# Patient Record
Sex: MALE | URBAN - METROPOLITAN AREA
[De-identification: names, ages, dates, MRNs, and addresses within clinical notes are randomized per-mention and may not be internally consistent; named-entity substitution may affect disease eponyms.]

---

## 2021-07-27 ENCOUNTER — TRANSCRIBE ORDER (OUTPATIENT)
Dept: SCHEDULING | Age: 66
End: 2021-07-27

## 2023-05-10 ENCOUNTER — HOSPITAL ENCOUNTER (OUTPATIENT)
Facility: HOSPITAL | Age: 68
Setting detail: OUTPATIENT SURGERY
Discharge: HOME | End: 2023-05-10
Attending: INTERNAL MEDICINE | Admitting: INTERNAL MEDICINE
Payer: MEDICARE

## 2023-05-10 ENCOUNTER — ANESTHESIA EVENT (OUTPATIENT)
Facility: HOSPITAL | Age: 68
End: 2023-05-10
Payer: MEDICARE

## 2023-05-10 ENCOUNTER — ANESTHESIA (OUTPATIENT)
Facility: HOSPITAL | Age: 68
End: 2023-05-10
Payer: MEDICARE

## 2023-05-10 VITALS
RESPIRATION RATE: 15 BRPM | SYSTOLIC BLOOD PRESSURE: 122 MMHG | WEIGHT: 203.48 LBS | TEMPERATURE: 97.9 F | OXYGEN SATURATION: 100 % | BODY MASS INDEX: 26.97 KG/M2 | HEIGHT: 73 IN | DIASTOLIC BLOOD PRESSURE: 71 MMHG | HEART RATE: 61 BPM

## 2023-05-10 LAB
GLUCOSE BLD STRIP.AUTO-MCNC: 86 MG/DL (ref 65–117)
SERVICE CMNT-IMP: NORMAL

## 2023-05-10 PROCEDURE — 3600007502: Performed by: INTERNAL MEDICINE

## 2023-05-10 PROCEDURE — 82962 GLUCOSE BLOOD TEST: CPT

## 2023-05-10 PROCEDURE — 3700000001 HC ADD 15 MINUTES (ANESTHESIA): Performed by: INTERNAL MEDICINE

## 2023-05-10 PROCEDURE — 7100000010 HC PHASE II RECOVERY - FIRST 15 MIN: Performed by: INTERNAL MEDICINE

## 2023-05-10 PROCEDURE — 3600007512: Performed by: INTERNAL MEDICINE

## 2023-05-10 PROCEDURE — 2709999900 HC NON-CHARGEABLE SUPPLY: Performed by: INTERNAL MEDICINE

## 2023-05-10 PROCEDURE — 2500000003 HC RX 250 WO HCPCS: Performed by: NURSE ANESTHETIST, CERTIFIED REGISTERED

## 2023-05-10 PROCEDURE — 7100000011 HC PHASE II RECOVERY - ADDTL 15 MIN: Performed by: INTERNAL MEDICINE

## 2023-05-10 PROCEDURE — 2580000003 HC RX 258: Performed by: INTERNAL MEDICINE

## 2023-05-10 PROCEDURE — 3700000000 HC ANESTHESIA ATTENDED CARE: Performed by: INTERNAL MEDICINE

## 2023-05-10 RX ORDER — SODIUM CHLORIDE 0.9 % (FLUSH) 0.9 %
5-40 SYRINGE (ML) INJECTION EVERY 12 HOURS SCHEDULED
Status: DISCONTINUED | OUTPATIENT
Start: 2023-05-10 | End: 2023-05-10 | Stop reason: HOSPADM

## 2023-05-10 RX ORDER — LIDOCAINE HYDROCHLORIDE 20 MG/ML
INJECTION, SOLUTION EPIDURAL; INFILTRATION; INTRACAUDAL; PERINEURAL PRN
Status: DISCONTINUED | OUTPATIENT
Start: 2023-05-10 | End: 2023-05-10 | Stop reason: SDUPTHER

## 2023-05-10 RX ORDER — SODIUM CHLORIDE 0.9 % (FLUSH) 0.9 %
5-40 SYRINGE (ML) INJECTION PRN
Status: DISCONTINUED | OUTPATIENT
Start: 2023-05-10 | End: 2023-05-10 | Stop reason: HOSPADM

## 2023-05-10 RX ORDER — SITAGLIPTIN AND METFORMIN HYDROCHLORIDE 1000; 50 MG/1; MG/1
1 TABLET, FILM COATED ORAL DAILY
COMMUNITY

## 2023-05-10 RX ORDER — SODIUM CHLORIDE 9 MG/ML
25 INJECTION, SOLUTION INTRAVENOUS PRN
Status: DISCONTINUED | OUTPATIENT
Start: 2023-05-10 | End: 2023-05-10 | Stop reason: HOSPADM

## 2023-05-10 RX ORDER — LISINOPRIL 10 MG/1
10 TABLET ORAL DAILY
COMMUNITY

## 2023-05-10 RX ORDER — TADALAFIL 5 MG/1
5 TABLET ORAL PRN
COMMUNITY

## 2023-05-10 RX ADMIN — SODIUM CHLORIDE: 9 INJECTION, SOLUTION INTRAVENOUS at 15:00

## 2023-05-10 RX ADMIN — LIDOCAINE HYDROCHLORIDE 60 MG: 20 INJECTION, SOLUTION EPIDURAL; INFILTRATION; INTRACAUDAL; PERINEURAL at 15:42

## 2023-05-10 ASSESSMENT — PAIN SCALES - GENERAL
PAINLEVEL_OUTOF10: 0

## 2023-05-10 ASSESSMENT — PAIN - FUNCTIONAL ASSESSMENT: PAIN_FUNCTIONAL_ASSESSMENT: NONE - DENIES PAIN

## 2023-05-10 NOTE — DISCHARGE INSTRUCTIONS
Varsha Giordano  [id number]  1955    DISCHARGE INSTRUCTIONS    Impression:  Colonic diverticulosis; no diverticulitis; no polyps    Recommendations:   Because of age, routine follow up exam not recommended     Discomfort:  Redness at IV site- apply warm compress to area; if redness or soreness persist- contact your physician. There may be a slight amount of blood passed from the rectum. Gaseous discomfort - walking, belching will help relieve any discomfort. You may not operate a vehicle for 12 hours. You may not engage in an occupation involving machinery or appliances for rest of today. You may not drink alcoholic beverages for at least 12 hours. Avoid making any critical decisions for at least 24 hours. DIET:   High fiber diet. Medications:                Resume usual medications today   ACTIVITY:  You may resume your normal daily activities it is recommended that you spend the remainder of the day resting -  avoid any strenuous activity. CALL M.D. ANY SIGN OF:   Increasing pain, nausea, vomiting  Abdominal distension (swelling)  New increased bleeding (oral or rectal)  Fever (chills)  Pain in chest area  Bloody discharge from nose or mouth  Shortness of breath     Follow-up Instructions:  Call Dr. Angela Isabel if you have any questions or problems        DISCHARGE SUMMARY from Nurse    The following personal items collected during your admission are returned to you:   Dental Appliance:    Vision:    Hearing Aid:    Jewelry:    Clothing:    Other Valuables:    Valuables sent to safe: Dose (mL/hr) Propofol : *70 mg + 77.532 mL/hr (There are multiple administrations at this time.   Please see the [facility] for detailed information.)

## 2023-05-10 NOTE — H&P
Gastroenterology Outpatient History and Physical    Patient: Bairesmirian Bowser    Physician: Ayo Montano MD    Vital Signs: See nursing notes    Allergies: Lipitor    Chief Complaint: Colon polyp surveillance    History of Present Illness: Personal history of tubular adenomas x4 removed from the colon in 2015; here for follow-up. Personal history of cardiac disease; had cardiac cath, nuclear cardiac testing in April 2022; no cardiac ischemia identified. Has a pacemaker; magnet can be used for pacemaker as necessary. Justification for Procedure: Polyp surveillance    History:  No past medical history on file. No past surgical history on file. No family history on file. Medications:   Prior to Admission medications    Not on File       Physical Exam:   General: alert, cooperative, no distress   HEENT: Head: Normal, normocephalic, atraumatic. Heart: regular rate and rhythm   Lungs: chest clear, no wheezing, rales, normal symmetric air entry   Abdominal: Bowel sounds are normal, liver is not enlarged, spleen is not enlarged           Findings/Diagnosis: Colon polyp surveillance    Plan of Care/Planned Procedure: I've discussed colonoscopy possible biopsy, polypectomy, cautery, injection, alternatives, complications including but not limited to pain, cardiopulmonary event, bleeding, perforation requiring additional blood transfusion or operative repair; all questions answered.

## 2023-05-10 NOTE — ANESTHESIA PRE PROCEDURE
Department of Anesthesiology  Preprocedure Note       Name:  Carlos Kline   Age:  70 y.o.  :  1955                                          MRN:  [id number]         Date:  5/10/2023      Surgeon: Yovany Romero):  Mckay Leonard MD    Procedure: Procedure(s):  COLONOSCOPY    Medications prior to admission:   Prior to Admission medications    Not on File       Current medications:    Current Facility-Administered Medications   Medication Dose Route Frequency Provider Last Rate Last Admin    sodium chloride flush 0.9 % injection 5-40 mL  5-40 mL IntraVENous 2 times per day Mckay Leonard MD        sodium chloride flush 0.9 % injection 5-40 mL  5-40 mL IntraVENous PRN Mckay Leonard MD        0.9 % sodium chloride infusion  25 mL IntraVENous PRN Mckay Leonard MD           Allergies: Allergies   Allergen Reactions    Lipitor [Atorvastatin] Other (See Comments)     Blood in the urine        Problem List:  There is no problem list on file for this patient. Past Medical History:        Diagnosis Date    Diabetes mellitus ([facility] [address]        Past Surgical History:        Procedure Laterality Date    HERNIA REPAIR      KNEE SURGERY Bilateral     Laproscopic knee surgery bilaterally    PACEMAKER PLACEMENT      AV pacemaker       Social History:    Social History     Tobacco Use    Smoking status: Never    Smokeless tobacco: Never   Substance Use Topics    Alcohol use: Not Currently                                Counseling given: Not Answered      Vital Signs (Current): There were no vitals filed for this visit.                                            BP Readings from Last 3 Encounters:   No data found for BP       NPO Status:                                                                                 BMI:   Wt Readings from Last 3 Encounters:   No data found for Wt     There is no height or weight on file to calculate BMI.    CBC: No results found for: WBC, RBC, HGB, HCT, MCV, RDW, PLT    CMP: No results found for:

## 2023-05-10 NOTE — PROGRESS NOTES
Endoscopy discharge instructions have been reviewed and given to patient. The patient verbalized understanding and acceptance of instructions. Dr. Jennifer Syed discussed with patient procedure findings and next steps. Glasses  returned to patient.

## 2023-05-10 NOTE — PROCEDURES
none    Implants: none    Complications:   None; patient tolerated the procedure well. Estimated blood loss: none    Impression:  Colonic diverticulosis; no diverticulitis; no polyps    Recommendations:   Because of age, routine follow up exam not recommended     Thank you for entrusting me with this patient's care. Please do not hesitate to contact me with any questions or if I can be of assistance with any of your other patients' GI needs.     Signed By: Odalis Deleon MD                        May 10, 2023

## 2023-05-10 NOTE — ANESTHESIA PRE PROCEDURE
Department of Anesthesiology  Preprocedure Note       Name:  Shazia Lara   Age:  70 y.o.  :  1955                                          MRN:  [id number]         Date:  5/10/2023      Surgeon: Luisito Evans):  Venetia Lefort, MD    Procedure: Procedure(s):  COLONOSCOPY    Medications prior to admission:   Prior to Admission medications    Medication Sig Start Date End Date Taking? Authorizing Provider   insulin lispro (HUMALOG) 100 UNIT/ML injection vial Inject into the skin 3 times daily (before meals)   Yes Historical Provider, MD   lisinopril (PRINIVIL;ZESTRIL) 10 MG tablet Take 1 tablet by mouth daily   Yes Historical Provider, MD   TAMSULOSIN HCL PO Take by mouth daily   Yes Historical Provider, MD   sitaGLIPtan-metFORMIN (JANUMET)  MG per tablet Take 1 tablet by mouth daily   Yes Historical Provider, MD   tadalafil (CIALIS) 5 MG tablet Take 1 tablet by mouth as needed for Erectile Dysfunction   Yes Historical Provider, MD       Current medications:    Current Facility-Administered Medications   Medication Dose Route Frequency Provider Last Rate Last Admin    sodium chloride flush 0.9 % injection 5-40 mL  5-40 mL IntraVENous 2 times per day Venetia Lefort, MD        sodium chloride flush 0.9 % injection 5-40 mL  5-40 mL IntraVENous PRN Venetia Lefort, MD        0.9 % sodium chloride infusion  25 mL IntraVENous PRN Venetia Lefort, MD           Allergies: Allergies   Allergen Reactions    Lipitor [Atorvastatin] Other (See Comments)     Blood in the urine        Problem List:  There is no problem list on file for this patient.       Past Medical History:        Diagnosis Date    Diabetes mellitus ([address]        Past Surgical History:        Procedure Laterality Date    HERNIA REPAIR      KNEE SURGERY Bilateral     Laproscopic knee surgery bilaterally    PACEMAKER PLACEMENT      AV pacemaker       Social History:    Social History     Tobacco Use    Smoking status: Never    Smokeless tobacco: Never

## 2023-05-10 NOTE — PROGRESS NOTES
Initial RN admission and assessment performed and documented in Endoscopy navigator. Patient evaluated by anesthesia in pre-procedure holding. All procedural vital signs, airway assessment, and level of consciousness information monitored and recorded by anesthesia staff on the anesthesia record. Report received from [address] [place] [address] post procedure. Patient transported to recovery area by RN. Report given to post procedure RN, Bren Ram RN. Endoscope was pre-cleaned at bedside immediately following procedure by [facility].

## 2023-05-10 NOTE — PROGRESS NOTES
Lyric Mak  1955  [id number]    Situation:  Verbal report received from: Shimon Conteh  Procedure: Procedure(s):  COLONOSCOPY     Background:    Preoperative diagnosis: Personal history of colonic polyps [Z86.010]   Postoperative diagnosis: * No post-op diagnosis entered *     :  Dr. Snehal Vieyra  Assistant(s): Circulator: Марина Cota RN  Endoscopy Technician: Perri Guerin LPN     Specimens: * No specimens in log *   H. Pylori  No    Assessment:  Intra-procedure medications   Anesthesia gave intra-procedure sedation and medications, see anesthesia flow sheet Yes    Intravenous fluids: NS@ KVO     Vital signs stable yes    Abdominal assessment: round and soft yes    Recommendation:  Discharge patient per MD order yes.   Return to floor na  Family or Friend family  Permission to share finding with family or friend Yes

## 2023-05-10 NOTE — ANESTHESIA PRE PROCEDURE
Department of Anesthesiology  Preprocedure Note       Name:  Mansfield Dancer   Age:  70 y.o.  :  1955                                          MRN:  [id number]         Date:  5/10/2023      Surgeon: Carmelita Morgan):  Libby Snowden MD    Procedure: Procedure(s):  COLONOSCOPY    Medications prior to admission:   Prior to Admission medications    Not on File       Current medications:    Current Facility-Administered Medications   Medication Dose Route Frequency Provider Last Rate Last Admin    sodium chloride flush 0.9 % injection 5-40 mL  5-40 mL IntraVENous 2 times per day Libby Snowden MD        sodium chloride flush 0.9 % injection 5-40 mL  5-40 mL IntraVENous PRN Libby Snowden MD        0.9 % sodium chloride infusion  25 mL IntraVENous PRN Libby Snowden MD           Allergies: Allergies   Allergen Reactions    Lipitor [Atorvastatin] Other (See Comments)     Blood in the urine        Problem List:  There is no problem list on file for this patient. Past Medical History:        Diagnosis Date    Diabetes mellitus ([address]        Past Surgical History:        Procedure Laterality Date    HERNIA REPAIR      KNEE SURGERY Bilateral     Laproscopic knee surgery bilaterally    PACEMAKER PLACEMENT      AV pacemaker       Social History:    Social History     Tobacco Use    Smoking status: Never    Smokeless tobacco: Never   Substance Use Topics    Alcohol use: Not Currently                                Counseling given: Not Answered      Vital Signs (Current): There were no vitals filed for this visit.                                            BP Readings from Last 3 Encounters:   No data found for BP       NPO Status:                                                                                 BMI:   Wt Readings from Last 3 Encounters:   No data found for Wt     There is no height or weight on file to calculate BMI.    CBC: No results found for: WBC, RBC, HGB, HCT, MCV, RDW, PLT    CMP: No results found for:

## 2023-05-11 NOTE — ANESTHESIA POSTPROCEDURE EVALUATION
Department of Anesthesiology  Postprocedure Note    Patient: Nato Parkinson  MRN: [id number]  YOB: 1955  Date of evaluation: 5/11/2023      Procedure Summary     Date: 05/10/23 Room / Location: [place] [address] / [facility] ENDOSCOPY    Anesthesia Start: 1533 Anesthesia Stop: 4390    Procedure: COLONOSCOPY (Lower GI Region) Diagnosis:       Personal history of colonic polyps      (Personal history of colonic polyps [Z86.010])    Surgeons: Mik Marks MD Responsible Provider: Angelina Victor MD    Anesthesia Type: MAC ASA Status: 3          Anesthesia Type: No value filed.     Nate Phase I: Nate Score: 9    Nate Phase II: Nate Score: 10      Anesthesia Post Evaluation    Patient location during evaluation: bedside  Patient participation: complete - patient participated  Level of consciousness: awake  Airway patency: patent  Nausea & Vomiting: no vomiting and no nausea  Complications: no  Cardiovascular status: hemodynamically stable  Respiratory status: acceptable  Hydration status: stable

## 2023-06-01 ENCOUNTER — TELEPHONE (OUTPATIENT)
Facility: CLINIC | Age: 68
End: 2023-06-01

## 2023-06-20 LAB
HBA1C MFR BLD HPLC: 9.1 %
MICROALBUMIN/CREATININE RATIO, EXTERNAL: 7

## 2023-07-06 LAB — LEFT VENTRICULAR EJECTION FRACTION, EXTERNAL: NORMAL

## 2023-10-11 LAB — HBA1C MFR BLD HPLC: 7.8 %

## 2024-04-18 LAB
HBA1C MFR BLD HPLC: 7.4 %
MICROALBUMIN/CREATININE RATIO, EXTERNAL: 4

## 2024-10-23 LAB — HBA1C MFR BLD HPLC: 7.4 %

## 2025-04-23 LAB — HBA1C MFR BLD HPLC: 7.1 %

## 2025-05-28 ENCOUNTER — TELEPHONE (OUTPATIENT)
Dept: PHARMACY | Facility: CLINIC | Age: 70
End: 2025-05-28

## 2025-05-28 NOTE — TELEPHONE ENCOUNTER
[facility] CLINICAL PHARMACY: ADHERENCE REVIEW  Identified care gap per [facility] fills with [facility]: ACE/ARB adherence    Medicare Advantage - MRMA  ACO  Per insurer report, LIS-0 - co-pays are based on tiers and patient is subject to coverage gap.  Patient also appears to be prescribed: Diabetes    ASSESSMENT    ACE/ARB ADHERENCE    Insurance Records claims through  25  (Prior Year PDC = not reported; YTD PDC = FIRST FILL; Potential Fail Date: 25):   LISINOPRIL TAB 10 MG last filled on 25 for 90 day supply. Next refill due: 25    Prescribed si tablet/capsule daily    Per Reconcile Dispense History and Insurer Portal: last filled on 25 for 90 day supply.     Per [facility]: Billed through [facility]. 1 refills remaining. will get  90 day supply ready to  since past due.    BP Readings from Last 3 Encounters:   05/10/23 122/71     CrCl cannot be calculated (No successful lab value found.).  No results found for: \"CREATININE\"  No results found for: \"K\"  DIABETES ADHERENCE    Insurance Records claims through  25  (Prior Year PDC = not reported; YTD PDC = FIRST FILL; Potential Fail Date: 25):   JANUMET XR TAB  MG last filled on 03.10.25 for 90 day supply. Next refill due: 25    Prescribed sig:  TAKE 2 TABLETS BY MOUTH EVERY EVENING    Per Reconcile Dispense History and Insurer Portal: last filled on 03.10.25 for 90 day supply.     Per [facility]:  3 refills remaining.    No results found for: \"LABA1C\"    PLAN  The following are interventions that have been identified:   Patient OVERDUE refilling LISINOPRIL TAB 10 MG and active on home medication list.   Refill/s of LISINOPRIL TAB 10 MG READY to  at patient's pharmacy [facility].  Patient eligible for 100 day supply  [facility] has pt's PCP listed as CLAUDIO BURCH BHAVNA. New provider is OSBALDO DAWKINS. Will send [facility] Letter.    Outreach:  Pharmacy:  [facility]

## (undated) DEVICE — SET GRAV CK VLV NEEDLESS ST 3 GANGED 4WAY STPCOCK HI FLO 10

## (undated) DEVICE — CONTAINER SPEC 20 ML LID NEUT BUFF FORMALIN 10 % POLYPR STS